# Patient Record
Sex: MALE | Race: WHITE | NOT HISPANIC OR LATINO | Employment: STUDENT | URBAN - METROPOLITAN AREA
[De-identification: names, ages, dates, MRNs, and addresses within clinical notes are randomized per-mention and may not be internally consistent; named-entity substitution may affect disease eponyms.]

---

## 2022-05-27 ENCOUNTER — PATIENT MESSAGE (OUTPATIENT)
Dept: HEMATOLOGY/ONCOLOGY | Facility: CLINIC | Age: 22
End: 2022-05-27
Payer: COMMERCIAL

## 2022-06-03 ENCOUNTER — OFFICE VISIT (OUTPATIENT)
Dept: HEMATOLOGY/ONCOLOGY | Facility: CLINIC | Age: 22
End: 2022-06-03
Payer: COMMERCIAL

## 2022-06-03 DIAGNOSIS — Z84.81 FAMILY HISTORY OF GENE MUTATION: ICD-10-CM

## 2022-06-03 DIAGNOSIS — Z71.83 ENCOUNTER FOR NONPROCREATIVE GENETIC COUNSELING: Primary | ICD-10-CM

## 2022-06-03 DIAGNOSIS — Z80.0 FAMILY HISTORY OF COLON CANCER: ICD-10-CM

## 2022-06-03 DIAGNOSIS — Z80.3 FAMILY HISTORY OF BREAST CANCER: ICD-10-CM

## 2022-06-03 PROCEDURE — 99204 OFFICE O/P NEW MOD 45 MIN: CPT | Mod: 95,,, | Performed by: PHYSICIAN ASSISTANT

## 2022-06-03 PROCEDURE — 99204 PR OFFICE/OUTPT VISIT, NEW, LEVL IV, 45-59 MIN: ICD-10-PCS | Mod: 95,,, | Performed by: PHYSICIAN ASSISTANT
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
## 2022-07-21 ENCOUNTER — DOCUMENTATION ONLY (OUTPATIENT)
Dept: HEMATOLOGY/ONCOLOGY | Facility: CLINIC | Age: 22
End: 2022-07-21
Payer: COMMERCIAL

## 2022-07-21 ENCOUNTER — PATIENT MESSAGE (OUTPATIENT)
Dept: HEMATOLOGY/ONCOLOGY | Facility: CLINIC | Age: 22
End: 2022-07-21
Payer: COMMERCIAL

## 2022-08-12 ENCOUNTER — PATIENT MESSAGE (OUTPATIENT)
Dept: HEMATOLOGY/ONCOLOGY | Facility: CLINIC | Age: 22
End: 2022-08-12
Payer: COMMERCIAL

## 2022-09-07 ENCOUNTER — DOCUMENTATION ONLY (OUTPATIENT)
Dept: HEMATOLOGY/ONCOLOGY | Facility: CLINIC | Age: 22
End: 2022-09-07

## 2022-09-07 NOTE — LETTER
[facility]  [address] [place] [address]  Phone [phone]  Fax [phone]    Dear relative of Eduardo Alves and his brother Padmaja had genetic testing that revealed a mutation in their HÉCTOR gene, which is associated with an increased risk for breast, ovarian and pancreatic cancer. Genetic mutations are passed directly from parent to child. Eduardo and Padmaja inherited this mutation from their mother, who inherited it from her father. This means that any blood relatives of their maternal grandfather could also have the HÉCTOR mutation.  Genetic counseling and testing is recommended to determine if you or other blood relatives have this mutation.     It is recommended that you see a genetic provider for a genetic risk assessment. The genetics provider will determine if you only need testing for HÉCTOR or if you should also be tested for other genes. If you only need testing for HÉCTOR, [facility] will test your HÉCTOR gene for free if your specimen is received by 2022. Please give this letter to your genetics provider.  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 The [place] provider can see virtual patients in [place] or [place]. The [place] providers can only see virtual patients located in [place]. Virtual patients must be able to access the virtual visit through the MyChart ([facility]) portal. Anyone who lives in another state or prefers to see someone in-person closer to  home can visit www.NSGC.org to locate a local genetic specialist in their area.    Individuals unable to see an [facility] provider can find a genetic counselor in their area by visiting the website for the National Society of Genetic Counselors (www.NSGC.org) and clicking Find a Genetic Counselor.     Finding out you have a genetic mutation can be difficult, but knowledge is power and increased screening and risk-reduction strategies can prevent cancer or identify cancer sooner when it is more treatable and curable.     Sincerely,  DAVID Reyes, PA-C  [facility]    Phone:  [phone]

## 2022-09-07 NOTE — LETTER
September 7, 2022    Eduardo Cardoso  [address]  [place] [address]     Dear Eduardo,    Below are the results from your recent cancer genetic testing. Please review and let us know if you have any questions or concerns. If you would like to schedule an in-person or a virtual appointment with me to further discuss the results, please message me through your [facility] patient portal or call [phone] to request a post-test genetic counseling appointment.    A copy of the [employer] results report is available to you in your [facility] medical record and the [employer] patient portal. If you have any difficulty accessing your report, please let our office know so we can mail you a hard copy.    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 There is emerging evidence to suggest that HÉCTOR may be associated with an increased risk for prostate cancer, but this has not been confirmed.     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 If you father a child with someone who has an HÉCTOR mutation, each child would have a 50% chance of inheriting one HÉCTOR mutation and a 25% chance of inheriting both mutations. A child with both mutations (a double HÉCTOR mutation) will have ataxia-telangiectasia. See below for details.        Germline HÉCTOR mutations:   Heterozygous/monoallelic (single) mutations: Individuals who inherit an HÉCTOR mutation from one parent are at increased risk for the following cancers:  Female breast cancer: Lifetime risk with an HÉCTOR mutation is 15-40% compared to 12.8% in the general population. Exception: Individuals with  the HÉCTOR variant c.7271T>G ([id number]) have a lifetime risk of 52-60%  Ovarian cancer, fallopian tube cancer and primary peritoneal cancer: Lifetime risk with an HÉCTOR mutation is less than 3% compared to 1% in the general population.   Exocrine pancreatic cancer:  Lifetime risk with an HÉCTOR mutation is 5-10% compared to 1% in the general population.   Homozygous/biallelic (double) mutations: Individuals who inherit an HÉCTOR mutation from from both parents (both parents have HÉCTOR mutations) can have a severe genetic condition called ataxia-telangiectasia (AT). AT is characterized by progressive difficulty with coordinating movements (ataxia), usually beginning before age 5, and including cerebellar ataxia, oculomotor apraxis, telangiectasias (coloquially known as spider veins) of the conjunctivae, immunodeficiency, premature aging, endocrine abnormalities and increased risk for cancer, particularly leukemia and lymphoma.  Implications for relatives: Genetic mutations are passed directly from parent to child, with each child having a 50% chance of inheriting the mutation. Therefore, blood relatives of anyone with a genetic mutation may also have the mutation. With risk-reducing strategies and proper screening, cancers can be prevented or detected in early stages when they are still treatable and curable, so it is important that relatives know whether or not they have this mutation.    Reproductive implications:  If both reproductive partners have one HÉCTOR mutation, each offspring has a 50% chance of inheriting one HÉCTOR mutation (monoallelic/heterozygous) and a 25% chance of inheriting both HÉCTOR mutations (biallelic/homozygous). The use of preimplantation genetic diagnosis (PGD) is relatively well established for severe hereditary disorders such as AT, and successful births have been reported with the use of PGD and in vitro fertilization (IVF) in mutation carriers, but data is still very limited.  Individuals with an HÉCTOR  mutation, or with a family history of an HÉCTOR mutation, who are wishing to reproduce may wish to first meet with a reproductive genetic counselor to discuss options.    HÉCTOR Screening Guidelines for Men  Pancreatic cancer  High-risk pancreatic cancer screening: Screening is recommended for individuals who have an HÉCTOR mutation and a first- or second-degree relative who has pancreatic cancer and either has or is suspected to have the same HÉCTOR mutation. Screening begins age 50 or at 10 years younger than the earliest known diagnosis of pancreatic cancer in the family, whichever comes first. Pancreatic cancer screening consist of annual contrast-enhanced MRI/magnetic resonance cholangiopancreatography (MRCP) and/or endoscopic ultrasound (EUS). Some individuals have no family history of pancreatic cancer, but may have individual factors that increase their risk for pancreatic cancer, such as a history of smoking, diabetes, obesity, pancreatitis or the presence of fatty deposits or cysts in the pancreas on imaging. These individuals can consider a referral to a high risk pancreas clinic to discuss their risk, strategies for risk reduction, and any screening recommendations.   Risk reduction includes diet and exercise to maintain a healthy weight, smoking cessation, and tight glycemic control.      Recommendations:  You are at increased risk for exocrine pancreatic cancer. Currently guidelines recommended pancreatic cancer screening only when there is an HÉCTOR mutation PLUS a close relative with pancreatic cancer. However, guidelines are being revised and may at some point recommend screening for anyone with an HÉCTOR mutation, regardless of family history, beginning at age 50. Since you are only 22, you could be referred to the High Risk Pancreas clinic for a one-time visit to discuss signs and symptoms of pancreatic cancer and what you can do to reduce your risk. It would also be reasonable to wait until you are closer to 50  and screen based on the guidelines at that time. In the meantime, you should avoid smoking, eat a healthy diet and exercise to maintain a healthy weight.   Genetic testing is recommended for at-risk relatives. A family letter will be sent to you to be distributed to family members with instructions on how to access testing.     Hereditary versus random/sporadic cancer:   Only a small percentage of cancers (5-10%) are hereditary, meaning they are caused by an inherited genetic variant (mutation). The remaining cancers (90-95%) are random or sporadic, caused often by a combination of risk factors including age, sex, race, physical characteristics and environmental and lifestyle factors (diet, obesity, smoking, lack of exercise, etc). Family members often share these risk factors resulting in multiple individuals with cancer. Even though your test was negative, you may still be at risk for certain cancers based on factors such as family history, genetic causes not evaluated with this test, or other lifestyle and environmental influences.     Cancer screening and risk reduction  It is important that you and your relatives establish care with a primary care provider (PCP), whom you see at least annually for routine health maintenance and guidance on cancer screening and cancer risk reduction. Keep your PCP updated on your personal and family history. If you are not established with a PCP, please contact me so I can submit a referral.      What you can do to reduce your risk for cancer:   Avoid tobacco use; exercise; maintain a healthy weight; eat a diet rich in fruits, vegetables, and whole grains and low in saturated/trans fat, red meat, and processed meat; limit alcohol consumption; protect against sexually transmitted infections; protect against sun exposure, avoid tanning beds; and get regular cancer screenings as recommended by your healthcare team.    Follow-up    Continue to follow up with all healthcare providers  as directed.    Please update me through [facility] with any changes to your personal or family history and with any relative's genetic testing results. I'm happy to review their results to determine how they might affect you and other family members. Genetics is an evolving field, so I invite you to contact me periodically to determine if any updated genetic testing is recommended for you or your relatives.     Sincerely,  DAVID Reyes, PA-C  Physician Assistant, Hereditary/High Risk Clinic  [facility]    Phone:  [phone]

## 2022-09-07 NOTE — PROGRESS NOTES
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 There is emerging evidence to suggest that HÉCTOR may be associated with an increased risk for prostate cancer, but this has not been confirmed.     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 If he fathers a child with someone who has an HÉCTOR mutation, each child would have a 50% chance of inheriting one HÉCTOR mutation and a 25% chance of inheriting both mutations. A child with both mutations (a double HÉCTOR mutation) will have ataxia-telangiectasia.         Personal and Family History:   Eduardo has no personal history of cancer.    Eduardo's family history is significant for:   Brother: HÉCTOR+  Mother: HÉCTOR+, breast cancer 45  MGGM (VENUS's mother): Breast cancer 45, rectal cancer 78  MGA x 2 (VENUS's sisters): Breast cancer at 60, 70    Germline HÉCTOR mutations:   Heterozygous/monoallelic (single) mutations: Individuals who inherit an HÉCTOR mutation from one parent are at increased risk for the following cancers:  Female breast cancer: Lifetime risk with an HÉCTOR mutation is 15-40% compared to 12.8% in the general population. Exception: Individuals with the HÉCTOR variant c.[id number] ([id number]) have a lifetime risk of 52-60%  Ovarian cancer, fallopian tube cancer and primary peritoneal cancer: Lifetime risk with an HÉCTOR mutation is less than 3% compared to 1% in the general " population.   Exocrine pancreatic cancer:  Lifetime risk with an HÉCTOR mutation is 5-10% compared to 1% in the general population.   Homozygous/biallelic (double) mutations: Individuals who inherit an HÉCTOR mutation from from both parents (both parents have HÉCTOR mutations) can have a severe genetic condition called ataxia-telangiectasia (AT). AT is characterized by progressive difficulty with coordinating movements (ataxia), usually beginning before age 5, and including cerebellar ataxia, oculomotor apraxis, telangiectasias (coloquially known as spider veins) of the conjunctivae, immunodeficiency, premature aging, endocrine abnormalities and increased risk for cancer, particularly leukemia and lymphoma.  Implications for relatives: Genetic mutations are passed directly from parent to child, with each child having a 50% chance of inheriting the mutation. Therefore, blood relatives of anyone with a genetic mutation may also have the mutation. With risk-reducing strategies and proper screening, cancers can be prevented or detected in early stages when they are still treatable and curable, so it is important that relatives know whether or not they have this mutation.    Reproductive implications:  If both reproductive partners have one HÉCTOR mutation, each offspring has a 50% chance of inheriting one HÉCTOR mutation (monoallelic/heterozygous) and a 25% chance of inheriting both HÉCTOR mutations (biallelic/homozygous). The use of preimplantation genetic diagnosis (PGD) is relatively well established for severe hereditary disorders such as AT, and successful births have been reported with the use of PGD and in vitro fertilization (IVF) in mutation carriers, but data is still very limited.  Individuals with an HÉCTOR mutation, or with a family history of an HÉCTOR mutation, who are wishing to reproduce may wish to first meet with a reproductive genetic counselor to discuss options.    HÉCTOR Screening Guidelines for Men  Pancreatic  cancer  High-risk pancreatic cancer screening: Screening is recommended for individuals who have an HÉCTOR mutation and a first- or second-degree relative who has pancreatic cancer and either has or is suspected to have the same HÉCTOR mutation. Screening begins age 50 or at 10 years younger than the earliest known diagnosis of pancreatic cancer in the family, whichever comes first. Pancreatic cancer screening consist of annual contrast-enhanced MRI/magnetic resonance cholangiopancreatography (MRCP) and/or endoscopic ultrasound (EUS). Some individuals have no family history of pancreatic cancer, but may have individual factors that increase their risk for pancreatic cancer, such as a history of smoking, diabetes, obesity, pancreatitis or the presence of fatty deposits or cysts in the pancreas on imaging. These individuals can consider a referral to a high risk pancreas clinic to discuss their risk, strategies for risk reduction, and any screening recommendations.   Risk reduction includes diet and exercise to maintain a healthy weight, smoking cessation, and tight glycemic control.      Recommendations:  Eduardo is at increased risk for exocrine pancreatic cancer. Currently guidelines recommended pancreatic cancer screening only when there is an HÉCTOR mutation PLUS a close relative with pancreatic cancer. However, guidelines are being revised and may at some point recommend screening for anyone with an HÉCTOR mutation, regardless of family history, beginning at age 50. Since Eduardo is only 22, he could be referred to the High Risk Pancreas clinic for a one-time visit to discuss signs and symptoms of pancreatic cancer and what he can do to reduce his risk. It would also be reasonable for him to wait until he is closer to 50, based on the guidelines at that time. In the meantime, he should avoid smoking, eat a healthy diet and exercise to maintain a healthy weight.   Genetic testing is recommended for at-risk relatives. A family  letter will be sent to the patient to be distributed to family members with instructions on how to access testing.     Patient notification:   Phone call: I spoke with Eduardo regarding his results and asked him to let me know when he, his brother and mother would like to have a follow up visit to discuss the results and recommendations.   Results letter: A letter will be sent to the patient via [employer] that contains the test results and recommendations and advises the patient to notify me of any changes to his personal or family history and the genetic testing results of any relatives. Since genetics is an evolving field, he is also advised to check in with me periodically to see if updated testing is indicated.   Results report: The Genetics Navigator will ensure that Eduardo receives a copy of his [facility] results report and that a copy is available in Epic.     DAVID Reyes, PALakeC  Hereditary/High Risk Clinic  Department of Hematology/Oncology  [facility]

## 2022-11-08 ENCOUNTER — PATIENT MESSAGE (OUTPATIENT)
Dept: HEMATOLOGY/ONCOLOGY | Facility: CLINIC | Age: 22
End: 2022-11-08
Payer: COMMERCIAL